# Patient Record
Sex: MALE | Race: BLACK OR AFRICAN AMERICAN | NOT HISPANIC OR LATINO | URBAN - METROPOLITAN AREA
[De-identification: names, ages, dates, MRNs, and addresses within clinical notes are randomized per-mention and may not be internally consistent; named-entity substitution may affect disease eponyms.]

---
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
## 2021-12-28 NOTE — ED ADULT NURSE NOTE - DISCHARGE DATE/TIME
· Blood pressure is slightly elevated  · Continue with lisinopril and metoprolol tartrate; added amlodipine- will increase to 10 mg daily   · Continue to monitor 21-Jan-2018 14:19

## 2023-05-18 ENCOUNTER — EMERGENCY (EMERGENCY)
Facility: HOSPITAL | Age: 23
LOS: 1 days | Discharge: HOME | End: 2023-05-18
Attending: EMERGENCY MEDICINE
Payer: COMMERCIAL

## 2023-05-18 VITALS
OXYGEN SATURATION: 99 % | HEIGHT: 76 IN | TEMPERATURE: 98 F | WEIGHT: 195.11 LBS | SYSTOLIC BLOOD PRESSURE: 120 MMHG | RESPIRATION RATE: 18 BRPM | HEART RATE: 48 BPM | DIASTOLIC BLOOD PRESSURE: 58 MMHG

## 2023-05-18 DIAGNOSIS — Z98.890 OTHER SPECIFIED POSTPROCEDURAL STATES: Chronic | ICD-10-CM

## 2023-05-18 PROCEDURE — 73110 X-RAY EXAM OF WRIST: CPT

## 2023-05-18 PROCEDURE — 99283 EMERGENCY DEPT VISIT LOW MDM: CPT

## 2023-05-18 PROCEDURE — 29125 APPL SHORT ARM SPLINT STATIC: CPT

## 2023-05-18 PROCEDURE — 99284 EMERGENCY DEPT VISIT MOD MDM: CPT | Mod: 25

## 2023-05-18 PROCEDURE — 29125 APPL SHORT ARM SPLINT STATIC: CPT | Mod: LT

## 2023-05-18 PROCEDURE — 73110 X-RAY EXAM OF WRIST: CPT | Mod: 26,LT

## 2023-05-18 RX ORDER — IBUPROFEN 200 MG
600 TABLET ORAL ONCE
Refills: 0 | Status: COMPLETED | OUTPATIENT
Start: 2023-05-18 | End: 2023-05-18

## 2023-05-18 RX ADMIN — Medication 600 MILLIGRAM(S): at 14:02

## 2023-05-18 NOTE — ED PROVIDER NOTE - MUSCULOSKELETAL, MLM
Statement Selected
LEFT WRIST: + ttp, no swelling or deformity. ROM limited 2/2 pain. NVI with strong radial pulse

## 2023-05-18 NOTE — ED PROCEDURE NOTE - CPROC ED POST RADIOGRAPHY1
extremity correctly positioned, splinted
Confirmed that patient received an additional set of vital signs prior to discharge.

## 2023-05-18 NOTE — ED ADULT NURSE NOTE - SUICIDE SCREENING QUESTION 2
No Quality 47: Advance Care Plan: Advance Care Planning discussed and documented; advance care plan or surrogate decision maker documented in the medical record. Detail Level: Detailed Quality 130: Documentation Of Current Medications In The Medical Record: Current Medications Documented Quality 226: Preventive Care And Screening: Tobacco Use: Screening And Cessation Intervention: Patient screened for tobacco use and is an ex/non-smoker Quality 431: Preventive Care And Screening: Unhealthy Alcohol Use - Screening: Patient screened for unhealthy alcohol use using a single question and scores less than 2 times per year

## 2023-05-18 NOTE — ED PROVIDER NOTE - CARE PROVIDER_API CALL
Antelmo Marsh)  Orthopaedic Surgery  [address] [place] [address]  Phone: [phone]  Fax: [phone]  Follow Up Time:

## 2023-05-18 NOTE — ED PROVIDER NOTE - OBJECTIVE STATEMENT
Patient is a 22 year old male With history of left wrist fracture in the past presenting with left wrist pain.  Patient notes he played several games basketball yesterday noted progressively worsening pain to left wrist.  Denies any actual trauma.  No forearm or elbow pain.  No anger injuries or pain.  No weakness or numbness in extremity.  Has tried Tylenol for it with some relief.  No other complaints

## 2023-05-18 NOTE — ED PROVIDER NOTE - PATIENT PORTAL LINK FT
You can access the FollowMyHealth Patient Portal offered by [facility] by registering at the following website: [email or web address]. By joining [facility]’s FollowMyHealth portal, you will also be able to view your health information using other applications (apps) compatible with our system.

## 2023-05-18 NOTE — ED PROVIDER NOTE - CLINICAL SUMMARY MEDICAL DECISION MAKING FREE TEXT BOX
patient presenting with left wrist pain.  No deformities or bony tenderness.  Will obtain x-ray.  Likely sprain.  Will treat with NSAIDs and likely discharge with outpatient follow-up

## 2023-05-18 NOTE — ED ADULT NURSE NOTE - OBJECTIVE STATEMENT
Pt A&Ox4, NAD. Pt presents to the ED with complaints of left wrist pain. Breathing even and unlabored.

## 2024-12-18 NOTE — ED PROCEDURE NOTE - CPROC ED DIRECTIONAL
Petey Cunningham was seen and treated in our emergency department on 12/18/2024.                Diagnosis:     Petey  may return to school on return date.    He may return on this date: 12/20/2024         If you have any questions or concerns, please don't hesitate to call.      José Luis Mari MD    ______________________________           _______________          _______________  Hospital Representative                              Date                                Time
left

## 2025-03-30 ENCOUNTER — NON-APPOINTMENT (OUTPATIENT)
Age: 25
End: 2025-03-30
